# Patient Record
(demographics unavailable — no encounter records)

---

## 2024-12-04 NOTE — DISCUSSION/SUMMARY
[FreeTextEntry1] : In a summary Lula Rocha is an- elderly- female with CAD s/p stents, stable. Hypertension, continue Cardizem. Hypercholesterolemia, continue Rosuvastatin. Paroxysmal atrial fibrillation, on Eliquis. Aware of risk of bleeding if falls. Understand and want to continue. LBBB, new or old. Hypertension and CAD, Echo done showed LVEF of 35-40%. Echo results explained. Refills sent. Follow up in 6 months.

## 2024-12-04 NOTE — HISTORY OF PRESENT ILLNESS
[FreeTextEntry1] : Lula Rocha is a 90- year-old female with CAD s/p stent to mid RCA in 8/2017 at [facility], hypertension, hypercholesterolemia, hypothyroidism and paroxysmal atrial fibrillation comes for cardiac evaluation. Used to follow up with different Cardiologist who is retired. Had recent social admission to hospital. Medications reviewed. Denies any chest pain or palpitations. No shortness of breath. Compliant to medications. Came with daughter Ms. Pan. Had fall history in 2023. No recurrent falls. Walks with walker. Has 24-hour Aids.

## 2024-12-04 NOTE — REVIEW OF SYSTEMS
[Joint Pain] : joint pain [Negative] : Respiratory [Blurry Vision] : no blurred vision [Dyspnea on exertion] : not dyspnea during exertion [Chest Discomfort] : no chest discomfort [Lower Ext Edema] : no extremity edema [Palpitations] : no palpitations [Orthopnea] : no orthopnea [PND] : no PND [Abdominal Pain] : no abdominal pain [Nausea] : no nausea [Vomiting] : no vomiting [Heartburn] : no heartburn [Rash] : no rash [Itching] : no itching [Dizziness] : no dizziness [Tremor] : no tremor was seen [Confusion] : no confusion was observed [Anxiety] : no anxiety [Under Stress] : not under stress [Easy Bleeding] : no tendency for easy bleeding [Easy Bruising] : no tendency for easy bruising

## 2024-12-04 NOTE — PHYSICAL EXAM
[Normal Conjunctiva] : normal conjunctiva [No Carotid Bruit] : no carotid bruit [Normal S1, S2] : normal S1, S2 [Soft] : abdomen soft [Non Tender] : non-tender [Normal Bowel Sounds] : normal bowel sounds [No Edema] : no edema [No Cyanosis] : no cyanosis [Normal] : alert and oriented, normal memory [de-identified] : walks with walker.

## 2025-01-13 NOTE — HISTORY OF PRESENT ILLNESS
[FreeTextEntry8] : The patient is a 91yo female with pmhx of HLD, CAD(s/p stent to mid RCA in 8/2017)  HTN, hypothyroidism, cognitive decline, and paroxysmal afib who presents today with her son in law and aide to establish care   - per pt UTD with influenza vaccinations   Follows with cardiology, Dr. Maradiaga, for hx of HLD, CAD(s/p stent to mid RCA in 8/2017)  HTN, and paroxysmal afib, LBBB She previously followed with ortho spine for hx of spinal stenosis -- pain has resolved  She follows with neurology for hx of cognitive decline, Dr. SELMA Raymond, [phone]  She has 24 hours aides

## 2025-01-13 NOTE — PHYSICAL EXAM
[No Acute Distress] : no acute distress [Well-Appearing] : well-appearing [Normal Sclera/Conjunctiva] : normal sclera/conjunctiva [EOMI] : extraocular movements intact [Normal Outer Ear/Nose] : the outer ears and nose were normal in appearance [No Respiratory Distress] : no respiratory distress  [No Accessory Muscle Use] : no accessory muscle use [Clear to Auscultation] : lungs were clear to auscultation bilaterally [Normal Rate] : normal rate  [Regular Rhythm] : with a regular rhythm [Normal S1, S2] : normal S1 and S2 [No Edema] : there was no peripheral edema [Soft] : abdomen soft [Non Tender] : non-tender [Non-distended] : non-distended [Normal Bowel Sounds] : normal bowel sounds [No CVA Tenderness] : no CVA  tenderness [No Spinal Tenderness] : no spinal tenderness [No Joint Swelling] : no joint swelling [Grossly Normal Strength/Tone] : grossly normal strength/tone [Coordination Grossly Intact] : coordination grossly intact [Normal Affect] : the affect was normal [de-identified] : +ambulates with walker [de-identified] : oriented to self, place, and year

## 2025-01-13 NOTE — PLAN
[FreeTextEntry1] : 1. HTN - chronic, stable - on cardizem - CMP ordered - RTC sooner for high or low BP on above regimen - follows with cardiology  2. CAD/HLD - s/p stent to mid RCA in 8/2017 - on Cardizem and rosuvastatin - lipid panel ordered - follows with cardiology, Dr. Maradiaga   3. Paroxysmal Afib - on Cardizem - on Eliquis - management per cardiology   4. Hypothyroidism - on levothyroxine 50mcg - TSH ordered  5. Spinal Stenosis  - ambulates with walker  - She previously followed with ortho spine for hx of spinal stenosis -- denies current pain  6. Prediabetes - A1c ordered - recommended low carb diet   7. Cognitive Decline - Stable off medication - She follows with neurology for hx of cognitive decline, Dr. SELMA Raymond, [phone]   f/u for [facility]

## 2025-04-15 NOTE — PLAN
[FreeTextEntry1] : 1. HTN - chronic, stable - on diltiazem - CMP ordered - RTC sooner for high or low BP on above regimen - follows with cardiology  2. CAD/HLD - s/p stent to mid RCA in 8/2017 - on Cardizem and rosuvastatin - repeat lipid panel when pt is fasting - follows with cardiology, Dr. Maradiaga  3. Paroxysmal Afib - on Cardizem - on Eliquis - management per cardiology  4. Hypothyroidism - on levothyroxine 50mcg - TSH ordered  5. Spinal Stenosis - ambulates with walker - She previously followed with ortho spine for hx of spinal stenosis -- denies current pain  6. Prediabetes - A1c 5.8 - repeat A1c ordered - recommended low carb diet  7. Cognitive Decline - Stable off medication - She follows with neurology for hx of cognitive decline, Dr. SELMA Raymond, [phone]   f/u for [facility].

## 2025-04-15 NOTE — PHYSICAL EXAM
[No Acute Distress] : no acute distress [Well-Appearing] : well-appearing [Normal Sclera/Conjunctiva] : normal sclera/conjunctiva [EOMI] : extraocular movements intact [Normal Outer Ear/Nose] : the outer ears and nose were normal in appearance [No Respiratory Distress] : no respiratory distress  [No Accessory Muscle Use] : no accessory muscle use [Clear to Auscultation] : lungs were clear to auscultation bilaterally [Normal Rate] : normal rate  [Normal S1, S2] : normal S1 and S2 [No Edema] : there was no peripheral edema [Soft] : abdomen soft [Non Tender] : non-tender [Non-distended] : non-distended [Normal Bowel Sounds] : normal bowel sounds [No CVA Tenderness] : no CVA  tenderness [No Spinal Tenderness] : no spinal tenderness [No Joint Swelling] : no joint swelling [Grossly Normal Strength/Tone] : grossly normal strength/tone [Coordination Grossly Intact] : coordination grossly intact [Normal Affect] : the affect was normal [de-identified] : +ambulates with walker [de-identified] : oriented to self, place, and year at baseline

## 2025-04-15 NOTE — HISTORY OF PRESENT ILLNESS
[FreeTextEntry1] : Follow Up [de-identified] : The patient is a 89yo female with pmhx of HLD, CAD(s/p stent to mid RCA in 8/2017) HTN, hypothyroidism, cognitive decline, and paroxysmal afib who presents today with her son in law and aide for follow up. She states she feels well today  Follows with cardiology, Dr. Maradiaga, for hx of HLD, CAD(s/p stent to mid RCA in 8/2017) HTN, and paroxysmal afib, LBBB She previously followed with ortho spine for hx of spinal stenosis -- pain has resolved She follows with neurology for hx of cognitive decline, Dr. SELMA Raymond, [phone]  She has 24 hours aides

## 2025-06-23 NOTE — CARDIOLOGY SUMMARY
[de-identified] : 6/23/2025: Sinus rhythm, LBBB. Procedure To Be Performed At Next Visit: Biopsy by shave method Detail Level: Simple X Size Of Lesion In Cm (Optional): 0 Introduction Text (Please End With A Colon): The following procedure was deferred: Size Of Lesion In Cm (Optional): 2.5

## 2025-06-23 NOTE — HISTORY OF PRESENT ILLNESS
[FreeTextEntry1] : Lula Rocha is a 91- year-old female with CAD s/p stent to mid RCA in 8/2017 at [facility], hypertension, hypercholesterolemia, hypothyroidism and paroxysmal atrial fibrillation comes for follow up visit.. Used to follow up with different Cardiologist who is retired. Had recent social admission to hospital. Medications reviewed. Denies any chest pain or palpitations. No shortness of breath. Compliant to medications. Came with daughter Ms. Pan. Had fall history in 2023. No recurrent falls. Walks with walker. Has 24-hour Aids.

## 2025-06-23 NOTE — DISCUSSION/SUMMARY
[FreeTextEntry1] : In a summary Lula Rocha is an- elderly- female with CAD s/p stents, stable. Hypertension, continue Cardizem. Hypercholesterolemia, continue Rosuvastatin. Paroxysmal atrial fibrillation, on Eliquis. Aware of risk of bleeding if falls. Understand and want to continue. Refills sent. Follow up in 6 months.

## 2025-06-23 NOTE — PHYSICAL EXAM
[Normal Conjunctiva] : normal conjunctiva [No Carotid Bruit] : no carotid bruit [Normal S1, S2] : normal S1, S2 [Soft] : abdomen soft [Non Tender] : non-tender [Normal Bowel Sounds] : normal bowel sounds [No Edema] : no edema [No Cyanosis] : no cyanosis [Normal] : alert and oriented, normal memory [de-identified] : walks with walker.